# Patient Record
Sex: FEMALE | Race: BLACK OR AFRICAN AMERICAN | NOT HISPANIC OR LATINO
[De-identification: names, ages, dates, MRNs, and addresses within clinical notes are randomized per-mention and may not be internally consistent; named-entity substitution may affect disease eponyms.]

---

## 2017-08-22 ENCOUNTER — RESULT REVIEW (OUTPATIENT)
Age: 47
End: 2017-08-22

## 2017-08-22 ENCOUNTER — APPOINTMENT (OUTPATIENT)
Dept: SURGERY | Facility: CLINIC | Age: 47
End: 2017-08-22

## 2017-08-22 ENCOUNTER — OUTPATIENT (OUTPATIENT)
Dept: OUTPATIENT SERVICES | Facility: HOSPITAL | Age: 47
LOS: 1 days | Discharge: HOME | End: 2017-08-22

## 2017-08-22 PROBLEM — Z00.00 ENCOUNTER FOR PREVENTIVE HEALTH EXAMINATION: Status: ACTIVE | Noted: 2017-08-22

## 2017-08-23 LAB — SURGICAL PATHOLOGY STUDY: SIGNIFICANT CHANGE UP

## 2017-08-28 DIAGNOSIS — N84.0 POLYP OF CORPUS UTERI: ICD-10-CM

## 2017-08-28 DIAGNOSIS — G43.909 MIGRAINE, UNSPECIFIED, NOT INTRACTABLE, WITHOUT STATUS MIGRAINOSUS: ICD-10-CM

## 2023-06-02 ENCOUNTER — APPOINTMENT (OUTPATIENT)
Dept: OBGYN | Facility: CLINIC | Age: 53
End: 2023-06-02
Payer: COMMERCIAL

## 2023-06-02 VITALS
WEIGHT: 176 LBS | BODY MASS INDEX: 32.39 KG/M2 | DIASTOLIC BLOOD PRESSURE: 77 MMHG | SYSTOLIC BLOOD PRESSURE: 113 MMHG | OXYGEN SATURATION: 94 % | HEIGHT: 62 IN | HEART RATE: 82 BPM

## 2023-06-02 DIAGNOSIS — N89.8 OTHER SPECIFIED NONINFLAMMATORY DISORDERS OF VAGINA: ICD-10-CM

## 2023-06-02 DIAGNOSIS — D25.9 LEIOMYOMA OF UTERUS, UNSPECIFIED: ICD-10-CM

## 2023-06-02 DIAGNOSIS — Z11.51 ENCOUNTER FOR SCREENING FOR HUMAN PAPILLOMAVIRUS (HPV): ICD-10-CM

## 2023-06-02 DIAGNOSIS — Z31.41 ENCOUNTER FOR FERTILITY TESTING: ICD-10-CM

## 2023-06-02 DIAGNOSIS — Z01.419 ENCOUNTER FOR GYNECOLOGICAL EXAMINATION (GENERAL) (ROUTINE) W/OUT ABNORMAL FINDINGS: ICD-10-CM

## 2023-06-02 PROCEDURE — 99203 OFFICE O/P NEW LOW 30 MIN: CPT

## 2023-06-02 NOTE — HISTORY OF PRESENT ILLNESS
[FreeTextEntry1] : 54 y/o new pt presents for consultation regarding fibroids. Pt had a myomectomy in 2007, where 2 large and several smaller fibroids. Denies pain or heavy periods. Pt c/o frequent urination, urinary retention, and always wears a panty liner. She does not f/u with a urologist. She has a h/o frequent constipation, but now has regular bowel movements. She c/o brown vaginal discharge with "strange" smell, with onset in the last few months. She had a cervical polyp removal in 2017. She has had regular periods for the last 2 years, but menses were irregular beforehand. She was previously diagnosed with PCOS. Pt reports she gained weight recently. She has taken metformin in the past, but did not feel well on it and discontinued. She would like to retain fertility. Pt is not currently sexually active, and does not have partner. She has been sexually active in the past, but has never been pregnant. \par \par US Pelvis TV on 11/2019:\par Uterus: 12.1 x 7.3 x 6.8 cm. Orientation: Anteverted/retroflexed. Appearance: Heterogenous myometrium due the presence of fibroids. \par Reference fibroids: Posterior body, 4.9 cm, intramural/subserosal fibroid. Left anterior body, intramural, 3.9 cm fibroid. Left posterior body, intramural/subserosal, 4.8 cm fibroid.

## 2023-06-02 NOTE — PLAN
[FreeTextEntry1] : 52 y/o new pt presents for consultation regarding fibroids.\par \par Due to deep myometrial invasion, pt should wait 6 months to conceive and needs primary  at 37-38 weeks due to concern/higher risk of uterine rupture with labor, pt verbalized full understanding.\par \par -rx pelvic US \par -rx pelvic MRI\par -BD Affirm done today\par -PAP/HPV done today\par -FSH, estrogen LH, and AMH levels today\par -discussed inositol for insulin resistance\par \par -discussed risks of repeat myomectomy in detail\par \par pt would like to proceed with myomectomy, need to discuss further following pelvic imaging, telehealth okay, can also be in person

## 2023-06-02 NOTE — END OF VISIT
[FreeTextEntry2] : I, Anne Marie Ledbetter, acted as a scribe on behalf of Dr. Brie Ayers on 06/02/2023 .\par \par All medical entries made by the scribe were at my, Dr. Brie Ayers, direction and personally dictated by me on 06/02/2023 . I have reviewed the chart and agree that the record accurately reflects my personal performance of the history, physical exam, assessment and plan. I have also personally directed, reviewed, and agreed with the chart.\par

## 2023-06-02 NOTE — PHYSICAL EXAM
[Appropriately responsive] : appropriately responsive [Alert] : alert [No Acute Distress] : no acute distress [Oriented x3] : oriented x3 [Soft] : soft [Non-tender] : non-tender [Non-distended] : non-distended [No HSM] : No HSM [No Lesions] : no lesions [No Mass] : no mass [Labia Majora] : normal [Labia Minora] : normal [Normal] : normal [Enlarged ___ wks] : enlarged [unfilled] ~Uweeks [Uterine Adnexae] : normal

## 2023-06-06 ENCOUNTER — TRANSCRIPTION ENCOUNTER (OUTPATIENT)
Age: 53
End: 2023-06-06

## 2024-05-14 LAB
ANTI-MUELLERIAN HORMONE: 0.03 NG/ML
CANDIDA VAG CYTO: NOT DETECTED
CYTOLOGY CVX/VAG DOC THIN PREP: NORMAL
ESTRADIOL SERPL-MCNC: 38 PG/ML
FSH SERPL-MCNC: 48.6 IU/L
G VAGINALIS+PREV SP MTYP VAG QL MICRO: NOT DETECTED
HPV HIGH+LOW RISK DNA PNL CVX: NOT DETECTED
LH SERPL-ACNC: 49.2 IU/L
T VAGINALIS VAG QL WET PREP: NOT DETECTED
TSH SERPL-ACNC: 1.1 UIU/ML